# Patient Record
Sex: FEMALE | Race: WHITE | NOT HISPANIC OR LATINO | Employment: OTHER | URBAN - METROPOLITAN AREA
[De-identification: names, ages, dates, MRNs, and addresses within clinical notes are randomized per-mention and may not be internally consistent; named-entity substitution may affect disease eponyms.]

---

## 2017-04-21 RX ORDER — THYROID 60 MG
TABLET ORAL
Qty: 14 TABLET | Refills: 0 | Status: SHIPPED | OUTPATIENT
Start: 2017-04-21 | End: 2017-06-11 | Stop reason: SDUPTHER

## 2017-06-09 RX ORDER — THYROID,PORK 90 MG
TABLET ORAL
Qty: 30 TABLET | Refills: 5 | Status: SHIPPED | OUTPATIENT
Start: 2017-06-09 | End: 2017-07-13 | Stop reason: SDUPTHER

## 2017-06-12 RX ORDER — THYROID 60 MG
TABLET ORAL
Qty: 14 TABLET | Refills: 0 | Status: SHIPPED | OUTPATIENT
Start: 2017-06-12 | End: 2017-08-24 | Stop reason: SDUPTHER

## 2017-06-13 RX ORDER — THYROID 60 MG
TABLET ORAL
Qty: 14 TABLET | Refills: 0 | Status: SHIPPED | OUTPATIENT
Start: 2017-06-13 | End: 2017-07-13 | Stop reason: SDUPTHER

## 2017-07-13 ENCOUNTER — TELEPHONE (OUTPATIENT)
Dept: FAMILY MEDICINE CLINIC | Facility: CLINIC | Age: 57
End: 2017-07-13

## 2017-07-13 RX ORDER — LEVOTHYROXINE AND LIOTHYRONINE 38; 9 UG/1; UG/1
60 TABLET ORAL DAILY
Qty: 30 TABLET | Refills: 0 | Status: SHIPPED | OUTPATIENT
Start: 2017-07-13 | End: 2019-07-10

## 2017-07-13 RX ORDER — THYROID,PORK 90 MG
90 TABLET ORAL DAILY
Qty: 30 TABLET | Refills: 0 | Status: SHIPPED | OUTPATIENT
Start: 2017-07-13 | End: 2017-08-24 | Stop reason: SDUPTHER

## 2017-07-13 NOTE — TELEPHONE ENCOUNTER
Made pt appt for 7/19/17    ---- Message from YOHANNES Ricardo sent at 7/12/2017  2:39 PM EDT -----  pls call pt and let her know due FU apt this mo for thyroid labs, does she need refill meds until apt?    ----- Message -----     From: Jean Carlos Myers     Sent: 7/12/2017   2:10 PM       To: YOHANNES Ricardo

## 2017-07-19 ENCOUNTER — OFFICE VISIT (OUTPATIENT)
Dept: FAMILY MEDICINE CLINIC | Facility: CLINIC | Age: 57
End: 2017-07-19

## 2017-07-19 VITALS
OXYGEN SATURATION: 93 % | HEART RATE: 67 BPM | HEIGHT: 64 IN | SYSTOLIC BLOOD PRESSURE: 124 MMHG | WEIGHT: 169 LBS | BODY MASS INDEX: 28.85 KG/M2 | DIASTOLIC BLOOD PRESSURE: 60 MMHG

## 2017-07-19 DIAGNOSIS — Z13.9 SCREENING: ICD-10-CM

## 2017-07-19 DIAGNOSIS — Z11.59 NEED FOR HEPATITIS C SCREENING TEST: ICD-10-CM

## 2017-07-19 DIAGNOSIS — J45.30 REACTIVE AIRWAY DISEASE, MILD PERSISTENT, UNCOMPLICATED: ICD-10-CM

## 2017-07-19 DIAGNOSIS — E03.8 OTHER SPECIFIED HYPOTHYROIDISM: Primary | ICD-10-CM

## 2017-07-19 LAB
ALBUMIN SERPL-MCNC: 4.5 G/DL (ref 3.5–5.2)
ALBUMIN/GLOB SERPL: 1.3 G/DL
ALP SERPL-CCNC: 82 U/L (ref 39–117)
ALT SERPL W P-5'-P-CCNC: 16 U/L (ref 1–33)
ANION GAP SERPL CALCULATED.3IONS-SCNC: 11.7 MMOL/L
AST SERPL-CCNC: 19 U/L (ref 1–32)
BILIRUB SERPL-MCNC: 0.5 MG/DL (ref 0.1–1.2)
BUN BLD-MCNC: 13 MG/DL (ref 6–20)
BUN/CREAT SERPL: 15.7 (ref 7–25)
CALCIUM SPEC-SCNC: 10.2 MG/DL (ref 8.6–10.5)
CHLORIDE SERPL-SCNC: 103 MMOL/L (ref 98–107)
CHOLEST SERPL-MCNC: 202 MG/DL (ref 0–200)
CO2 SERPL-SCNC: 26.3 MMOL/L (ref 22–29)
CREAT BLD-MCNC: 0.83 MG/DL (ref 0.57–1)
ERYTHROCYTE [DISTWIDTH] IN BLOOD BY AUTOMATED COUNT: 13 % (ref 4.5–15)
GFR SERPL CREATININE-BSD FRML MDRD: 70 ML/MIN/1.73
GLOBULIN UR ELPH-MCNC: 3.5 GM/DL
GLUCOSE BLD-MCNC: 87 MG/DL (ref 65–99)
HCT VFR BLD AUTO: 44.2 % (ref 31–42)
HCV AB SER DONR QL: NORMAL
HDLC SERPL-MCNC: 30 MG/DL (ref 40–60)
HGB BLD-MCNC: 15.1 G/DL (ref 12–18)
LDLC SERPL CALC-MCNC: 112 MG/DL (ref 0–100)
LDLC/HDLC SERPL: 3.74 {RATIO}
LYMPHOCYTES # BLD AUTO: 2.4 10*3/MM3 (ref 1.2–3.4)
LYMPHOCYTES NFR BLD AUTO: 33.9 % (ref 21–51)
MCH RBC QN AUTO: 29.6 PG (ref 26.1–33.1)
MCHC RBC AUTO-ENTMCNC: 34 G/DL (ref 33–37)
MCV RBC AUTO: 86.8 FL (ref 80–99)
MONOCYTES # BLD AUTO: 0.4 10*3/MM3 (ref 0.1–0.6)
MONOCYTES NFR BLD AUTO: 5.8 % (ref 2–9)
NEUTROPHILS # BLD AUTO: 4.3 10*3/MM3 (ref 1.4–6.5)
NEUTROPHILS NFR BLD AUTO: 60.3 % (ref 42–75)
PLATELET # BLD AUTO: 246 10*3/MM3 (ref 150–450)
PMV BLD AUTO: 7.4 FL (ref 7.1–10.5)
POTASSIUM BLD-SCNC: 4.7 MMOL/L (ref 3.5–5.2)
PROT SERPL-MCNC: 8 G/DL (ref 6–8.5)
RBC # BLD AUTO: 5.09 10*6/MM3 (ref 4–6)
SODIUM BLD-SCNC: 141 MMOL/L (ref 136–145)
T-UPTAKE NFR SERPL: 1 TBI (ref 0.8–1.3)
T4 SERPL-MCNC: 6.01 MCG/DL (ref 4.5–11.7)
TRIGL SERPL-MCNC: 299 MG/DL (ref 0–150)
TSH SERPL DL<=0.05 MIU/L-ACNC: 1.06 MIU/ML (ref 0.27–4.2)
VLDLC SERPL-MCNC: 59.8 MG/DL (ref 5–40)
WBC NRBC COR # BLD: 7.2 10*3/MM3 (ref 4.5–10)

## 2017-07-19 PROCEDURE — 85025 COMPLETE CBC W/AUTO DIFF WBC: CPT | Performed by: NURSE PRACTITIONER

## 2017-07-19 PROCEDURE — 36415 COLL VENOUS BLD VENIPUNCTURE: CPT | Performed by: NURSE PRACTITIONER

## 2017-07-19 PROCEDURE — 84443 ASSAY THYROID STIM HORMONE: CPT | Performed by: NURSE PRACTITIONER

## 2017-07-19 PROCEDURE — 84479 ASSAY OF THYROID (T3 OR T4): CPT | Performed by: NURSE PRACTITIONER

## 2017-07-19 PROCEDURE — 86803 HEPATITIS C AB TEST: CPT | Performed by: NURSE PRACTITIONER

## 2017-07-19 PROCEDURE — 99213 OFFICE O/P EST LOW 20 MIN: CPT | Performed by: NURSE PRACTITIONER

## 2017-07-19 PROCEDURE — 84436 ASSAY OF TOTAL THYROXINE: CPT | Performed by: NURSE PRACTITIONER

## 2017-07-19 PROCEDURE — 80053 COMPREHEN METABOLIC PANEL: CPT | Performed by: NURSE PRACTITIONER

## 2017-07-19 PROCEDURE — 80061 LIPID PANEL: CPT | Performed by: NURSE PRACTITIONER

## 2017-07-19 NOTE — PATIENT INSTRUCTIONS
check labs and call with results, cont armour, gave sample breo and coupon and erx breo 200 mcg, enc overdue FU with GYN Dr Brown

## 2017-07-19 NOTE — PROGRESS NOTES
Yordan Evans is a 57 y.o. female.     History of Present Illness   Here to FU on hypothyroid on armour 60 mg T, R, Sat, Sun, 90 mg M, W, F, last TSH 07/16 normal, no sx of high or low thyroid  Saw Dr Torrez pulm 07/16 dx post-inflammatory reactive airway and started on breo 100 mcg had to increase 200 mcg no SOA wheezing cough former smoker with last CXR 04/16 and CT chest 07/16  Hx of hernia repair with mesh Dr Merino and notes sometimes gets abd pain or feels mesh when lifting or moving but no pain currently  Sees Dr Kishor Schroeder overdue pap and mammo  Last colonoscopy Dr Abreu age 48 due next year    The following portions of the patient's history were reviewed and updated as appropriate: allergies, current medications, past family history, past medical history, past social history, past surgical history and problem list.    Review of Systems   Constitutional: Negative for fever.   HENT: Negative for trouble swallowing and voice change.    Respiratory: Negative for cough, chest tightness, shortness of breath and wheezing.    Cardiovascular: Negative for chest pain, palpitations and leg swelling.   Gastrointestinal: Negative for abdominal distention, abdominal pain, anal bleeding and blood in stool.   Endocrine: Negative for cold intolerance, heat intolerance, polydipsia, polyphagia and polyuria.   Neurological: Negative for dizziness and headaches.   All other systems reviewed and are negative.      Objective   Physical Exam   Constitutional: She is oriented to person, place, and time. She appears well-developed and well-nourished.   HENT:   Head: Normocephalic and atraumatic.   Eyes: Conjunctivae and EOM are normal. Pupils are equal, round, and reactive to light.   Neck: Normal range of motion. Neck supple. No thyromegaly present.   Cardiovascular: Normal rate, regular rhythm and normal heart sounds.    Pulmonary/Chest: Effort normal and breath sounds normal.   Musculoskeletal: Normal range of  motion.   Lymphadenopathy:     She has no cervical adenopathy.   Neurological: She is alert and oriented to person, place, and time.   Skin: Skin is warm and dry.   Psychiatric: She has a normal mood and affect. Her behavior is normal. Judgment and thought content normal.   Vitals reviewed.      Assessment/Plan   Adamaris was seen today for follow-up.    Diagnoses and all orders for this visit:    Other specified hypothyroidism  -     Thyroid Panel With TSH    Reactive airway disease, mild persistent, uncomplicated  -     CBC & Differential  -     Comprehensive Metabolic Panel  -     CBC Auto Differential    Need for hepatitis C screening test  -     Hepatitis C Antibody    Screening  -     CBC & Differential  -     Comprehensive Metabolic Panel  -     Lipid Panel  -     CBC Auto Differential    Other orders  -     Fluticasone Furoate-Vilanterol (BREO ELLIPTA) 200-25 MCG/INH aerosol powder ; Inhale 200 mcg Daily.    check labs and call with results, cont armour, gave sample breo and coupon and erx breo 200 mcg, enc overdue FU with GYN Dr Schroeder

## 2017-07-21 ENCOUNTER — TELEPHONE (OUTPATIENT)
Dept: FAMILY MEDICINE CLINIC | Facility: CLINIC | Age: 57
End: 2017-07-21

## 2017-07-21 NOTE — TELEPHONE ENCOUNTER
Hep C screening negative, normal. Thyroid panel all normal. BS, kidney, liver normal. No anemias. Chol elevated slightly and triglycerides high 299 goal < 150, recommend fish oil 1000 mg daily and low chol diet and exercise

## 2017-08-23 ENCOUNTER — TELEPHONE (OUTPATIENT)
Dept: FAMILY MEDICINE CLINIC | Facility: CLINIC | Age: 57
End: 2017-08-23

## 2017-08-24 ENCOUNTER — TELEPHONE (OUTPATIENT)
Dept: FAMILY MEDICINE CLINIC | Facility: CLINIC | Age: 57
End: 2017-08-24

## 2017-08-24 RX ORDER — LEVOTHYROXINE AND LIOTHYRONINE 38; 9 UG/1; UG/1
60 TABLET ORAL DAILY
Qty: 90 TABLET | Refills: 1 | Status: SHIPPED | OUTPATIENT
Start: 2017-08-24 | End: 2018-07-19 | Stop reason: SDUPTHER

## 2017-08-24 RX ORDER — THYROID,PORK 90 MG
90 TABLET ORAL DAILY
Qty: 90 TABLET | Refills: 1 | Status: SHIPPED | OUTPATIENT
Start: 2017-08-24 | End: 2019-07-10

## 2018-07-19 ENCOUNTER — OFFICE VISIT (OUTPATIENT)
Dept: OBSTETRICS AND GYNECOLOGY | Age: 58
End: 2018-07-19

## 2018-07-19 ENCOUNTER — APPOINTMENT (OUTPATIENT)
Dept: WOMENS IMAGING | Facility: HOSPITAL | Age: 58
End: 2018-07-19

## 2018-07-19 VITALS
BODY MASS INDEX: 28.34 KG/M2 | HEIGHT: 64 IN | DIASTOLIC BLOOD PRESSURE: 84 MMHG | WEIGHT: 166 LBS | SYSTOLIC BLOOD PRESSURE: 132 MMHG

## 2018-07-19 DIAGNOSIS — E03.9 ACQUIRED HYPOTHYROIDISM: ICD-10-CM

## 2018-07-19 DIAGNOSIS — Z12.11 SCREEN FOR COLON CANCER: ICD-10-CM

## 2018-07-19 DIAGNOSIS — Z13.89 SCREENING FOR BLOOD OR PROTEIN IN URINE: ICD-10-CM

## 2018-07-19 DIAGNOSIS — K21.9 GASTROESOPHAGEAL REFLUX DISEASE WITHOUT ESOPHAGITIS: ICD-10-CM

## 2018-07-19 DIAGNOSIS — Z12.31 SCREENING MAMMOGRAM, ENCOUNTER FOR: ICD-10-CM

## 2018-07-19 DIAGNOSIS — Z01.419 WELL FEMALE EXAM WITH ROUTINE GYNECOLOGICAL EXAM: Primary | ICD-10-CM

## 2018-07-19 DIAGNOSIS — Z12.4 PAP SMEAR FOR CERVICAL CANCER SCREENING: ICD-10-CM

## 2018-07-19 LAB
BILIRUB BLD-MCNC: NEGATIVE MG/DL
CLARITY, POC: CLEAR
COLOR UR: YELLOW
GLUCOSE UR STRIP-MCNC: NEGATIVE MG/DL
KETONES UR QL: NEGATIVE
LEUKOCYTE EST, POC: NEGATIVE
NITRITE UR-MCNC: NEGATIVE MG/ML
PH UR: 6 [PH] (ref 5–8)
PROT UR STRIP-MCNC: NEGATIVE MG/DL
RBC # UR STRIP: NEGATIVE /UL
SP GR UR: 1.01 (ref 1–1.03)
UROBILINOGEN UR QL: NORMAL

## 2018-07-19 PROCEDURE — 77067 SCR MAMMO BI INCL CAD: CPT | Performed by: RADIOLOGY

## 2018-07-19 PROCEDURE — 81002 URINALYSIS NONAUTO W/O SCOPE: CPT | Performed by: OBSTETRICS & GYNECOLOGY

## 2018-07-19 PROCEDURE — 99396 PREV VISIT EST AGE 40-64: CPT | Performed by: OBSTETRICS & GYNECOLOGY

## 2018-07-19 NOTE — PROGRESS NOTES
Routine Annual Visit    2018    Patient: Adamaris Evans          MR#:[id number]      History of Present Illness    Chief Complaint   Patient presents with   • Establish Care     Old/new patient   • Gynecologic Exam     Annual.  Last pap 4/20/15, negative. Last mammo 5/4/15, negative. Colon        58 y.o. female  who presents for annual exam.    The patient presents for routine annual exam feeling well without complaints.  She reports her last screening colonoscopy was more than 10 years ago    No LMP recorded (lmp unknown). Patient is postmenopausal.  Obstetric History:  OB History      Para Term  AB Living    2 2 2     2    SAB TAB Ectopic Molar Multiple Live Births              2         Menstrual History:     No LMP recorded (lmp unknown). Patient is postmenopausal.       Sexual History:       ________________________________________  Patient Active Problem List   Diagnosis   • Acquired hypothyroidism   • Gastroesophageal reflux disease   • Well female exam with routine gynecological exam       Past Medical History:   Diagnosis Date   • GERD (gastroesophageal reflux disease)    • History of fibromyalgia    • Hyperthyroidism        Past Surgical History:   Procedure Laterality Date   • BUNIONECTOMY Left    • DENTAL PROCEDURE      implants   • DIAGNOSTIC LAPAROSCOPY     • ECTOPIC PREGNANCY     • TONSILLECTOMY     • TUBAL ABDOMINAL LIGATION     • UMBILICAL HERNIA REPAIR      w/Mesh.  Dr. Nolasco.       History   Smoking Status   • Former Smoker   Smokeless Tobacco   • Never Used       Family History   Problem Relation Age of Onset   • Thyroid disease Mother    • Neuropathy Mother    • Clotting disorder Father    • Alzheimer's disease Father    • Dementia Father    • Clotting disorder Sister    • Clotting disorder Daughter        Prior to Admission medications    Medication Sig Start Date End Date Taking? Authorizing Provider   RUBEN THYROID 90 MG tablet Take 1  "tablet by mouth Daily. 8/24/17  Yes YOHANNES Ricardo   BIOTIN PO Take  by mouth. 3/6/14  Yes Historical Provider, MD   Fluticasone Furoate-Vilanterol (BREO ELLIPTA) 200-25 MCG/INH aerosol powder  Inhale 200 mcg Daily.  Patient taking differently: Inhale 200 mcg As Needed. 7/19/17  Yes YOHANNES Ricardo   Probiotic Product (TRUBIOTICS) capsule Take  by mouth. 7/10/15  Yes Historical Provider, MD   Thyroid (RUBEN THYROID) 60 MG PO tablet Take 1 tablet by mouth Daily. 7/13/17  Yes YOHANNES Ricardo   Fluticasone Furoate-Vilanterol 200-25 MCG/INH aerosol powder  Inhale. 7/19/17 7/19/18 Yes Historical Provider, MD   albuterol (PROAIR RESPICLICK) 108 (90 BASE) MCG/ACT inhaler Inhale 2 puffs every 4 (four) hours as needed for wheezing. 5/11/16   YOHANNES Ricardo   KRILL OIL PO Take 1 capsule by mouth. 10/12/15   Historical Provider, MD   Omega-3 Fatty Acids (OMEGA 3 PO) Take  by mouth. 12/29/14   Historical Provider, MD   Thyroid (RUBEN THYROID) 60 MG PO tablet Take 1 tablet by mouth Daily. 8/24/17 7/19/18  YOHANNES Ricardo     ________________________________________    Current contraception: post menopausal status  History of abnormal Pap smear: no  Family history of uterine or ovarian cancer: no  Family History of colon cancer/colon polyps: no  History of abnormal mammogram: no  History of abnormal lipids: yes - No treatement per IM as this time     The following portions of the patient's history were reviewed and updated as appropriate: allergies, current medications, past family history, past medical history, past social history, past surgical history and problem list.    Review of Systems    Pertinent items are noted in HPI.     Objective   Physical Exam    /84   Ht 162.6 cm (64\")   Wt 75.3 kg (166 lb)   LMP  (LMP Unknown)   Breastfeeding? No   BMI 28.49 kg/m²    BP Readings from Last 3 Encounters:   07/19/18 132/84   07/19/17 124/60   04/29/16 110/70 " "     Wt Readings from Last 3 Encounters:   07/19/18 75.3 kg (166 lb)   07/19/17 76.7 kg (169 lb)   04/29/16 77.9 kg (171 lb 12.8 oz)        BMI: Estimated body mass index is 28.49 kg/m² as calculated from the following:    Height as of this encounter: 162.6 cm (64\").    Weight as of this encounter: 75.3 kg (166 lb).    General:   alert, appears stated age, cooperative and no distress   Heart: regular rate and rhythm, S1, S2 normal, no murmur, click, rub or gallop   Lungs: clear to auscultation bilaterally   Abdomen: soft, non-tender, without masses or organomegaly   Breast: inspection negative, no nipple discharge or bleeding, no masses or nodularity palpable   Vulva: normal, moderate atrophy   Vagina: normal mucosa, atrophy    Cervix: no lesions   Uterus: normal size   Adnexa: exam limited by habitus     As part of wellness and prevention, the following topics were discussed with the patient:     []  Nutrition  [x]  Physical activity/regular exercise   []  Healthy weight  []  Injury prevention  []  Smoking cessation  []  Substance misuse/abuse  []  Sexual behavior  []  STD prevention  []  Contaception  []  Dental health  []  Mental health  []  Immunization  [x]  Encouraged SBE       Assessment:    Adamaris was seen today for establish care and gynecologic exam.    Diagnoses and all orders for this visit:    Well female exam with routine gynecological exam    Screening for blood or protein in urine  -     POC Urinalysis Dipstick    Pap smear for cervical cancer screening  -     IgP, Aptima HPV    Screening mammogram, encounter for  -     Mammo Screening Bilateral With CAD; Future    Acquired hypothyroidism    Gastroesophageal reflux disease without esophagitis    Screen for colon cancer  -     Ambulatory Referral For Screening Colonoscopy          Plan:  Return in about 1 year (around 7/19/2019) for Annual GYN exam.      Kishor Schroeder MD  7/19/2018 10:09 AM  "

## 2018-07-24 ENCOUNTER — TELEPHONE (OUTPATIENT)
Dept: OBSTETRICS AND GYNECOLOGY | Age: 58
End: 2018-07-24

## 2018-07-24 LAB
CYTOLOGIST CVX/VAG CYTO: NORMAL
CYTOLOGY CVX/VAG DOC THIN PREP: NORMAL
DX ICD CODE: NORMAL
HIV 1 & 2 AB SER-IMP: NORMAL
HPV I/H RISK 4 DNA CVX QL PROBE+SIG AMP: NEGATIVE
OTHER STN SPEC: NORMAL
PATH REPORT.FINAL DX SPEC: NORMAL
STAT OF ADQ CVX/VAG CYTO-IMP: NORMAL

## 2018-07-24 NOTE — TELEPHONE ENCOUNTER
----- Message from Kishor Schroeder MD sent at 7/24/2018  1:32 PM EDT -----  Call pt:  PAP is negative.

## 2019-07-09 ENCOUNTER — TELEPHONE (OUTPATIENT)
Dept: OBSTETRICS AND GYNECOLOGY | Age: 59
End: 2019-07-09

## 2019-07-10 ENCOUNTER — PROCEDURE VISIT (OUTPATIENT)
Dept: OBSTETRICS AND GYNECOLOGY | Age: 59
End: 2019-07-10

## 2019-07-10 ENCOUNTER — OFFICE VISIT (OUTPATIENT)
Dept: OBSTETRICS AND GYNECOLOGY | Age: 59
End: 2019-07-10

## 2019-07-10 VITALS
SYSTOLIC BLOOD PRESSURE: 130 MMHG | HEIGHT: 64 IN | WEIGHT: 168 LBS | DIASTOLIC BLOOD PRESSURE: 84 MMHG | BODY MASS INDEX: 28.68 KG/M2

## 2019-07-10 DIAGNOSIS — R10.2 PELVIC PAIN: Primary | ICD-10-CM

## 2019-07-10 LAB
BILIRUB BLD-MCNC: NEGATIVE MG/DL
CLARITY, POC: CLEAR
COLOR UR: YELLOW
GLUCOSE UR STRIP-MCNC: NEGATIVE MG/DL
KETONES UR QL: NEGATIVE
LEUKOCYTE EST, POC: NEGATIVE
NITRITE UR-MCNC: NEGATIVE MG/ML
PH UR: 5.5 [PH] (ref 5–8)
PROT UR STRIP-MCNC: NEGATIVE MG/DL
RBC # UR STRIP: NEGATIVE /UL
SP GR UR: 1 (ref 1–1.03)
UROBILINOGEN UR QL: NORMAL

## 2019-07-10 PROCEDURE — 76830 TRANSVAGINAL US NON-OB: CPT | Performed by: OBSTETRICS & GYNECOLOGY

## 2019-07-10 PROCEDURE — 99213 OFFICE O/P EST LOW 20 MIN: CPT | Performed by: NURSE PRACTITIONER

## 2019-07-10 PROCEDURE — 81002 URINALYSIS NONAUTO W/O SCOPE: CPT | Performed by: NURSE PRACTITIONER

## 2019-07-10 RX ORDER — LEVOTHYROXINE SODIUM 0.1 MG/1
100 TABLET ORAL DAILY
COMMUNITY
Start: 2019-07-05

## 2019-07-10 NOTE — PROGRESS NOTES
[facility]  Routine Annual Visit    7/10/2019    Patient: Adamaris Evans          MR#:[id number]      History of Present Illness    59 y.o. female  who presents with complaint of intermittent pelvic discomfort the past 2 weeks now seeming to resolve. She describes the discomfort as cramping and heaviness. Denies dysuria, backache, fever/chills, vaginal bleeding. No associated symptoms. Hx uterine ablation.       No LMP recorded (lmp unknown). Patient is postmenopausal.  Obstetric History:  OB History      Para Term  AB Living    2 2 2     2    SAB TAB Ectopic Molar Multiple Live Births              2         Menstrual History:     No LMP recorded (lmp unknown). Patient is postmenopausal.       Sexual History:       ________________________________________  Patient Active Problem List   Diagnosis   • Acquired hypothyroidism   • Gastroesophageal reflux disease   • Well female exam with routine gynecological exam       Past Medical History:   Diagnosis Date   • GERD (gastroesophageal reflux disease)    • History of fibromyalgia    • Hyperthyroidism        Past Surgical History:   Procedure Laterality Date   • BUNIONECTOMY Left    • DENTAL PROCEDURE  2008    implants   • DIAGNOSTIC LAPAROSCOPY     • ECTOPIC PREGNANCY     • TONSILLECTOMY     • TUBAL ABDOMINAL LIGATION     • UMBILICAL HERNIA REPAIR      w/Mesh.  Dr. Nolasco.       Social History     Tobacco Use   Smoking Status Former Smoker   Smokeless Tobacco Never Used       Family History   Problem Relation Age of Onset   • Thyroid disease Mother    • Neuropathy Mother    • Clotting disorder Father    • Alzheimer's disease Father    • Dementia Father    • Clotting disorder Sister    • Clotting disorder Daughter        Prior to Admission medications    Medication Sig Start Date End Date Taking? Authorizing Provider   Multiple Vitamin (MULTI-VITAMIN DAILY PO) Take  by mouth.   Yes Provider, Historical, MD   BIOTIN  "PO Take  by mouth. 3/6/14   Ramone Pagan MD   levothyroxine (SYNTHROID, LEVOTHROID) 100 MCG tablet  7/5/19   Ramone Pagan MD   Probiotic Product (TRUBIOTICS) capsule Take  by mouth. 7/10/15   Ramone Pagan MD   albuterol (PROAIR RESPICLICK) 108 (90 BASE) MCG/ACT inhaler Inhale 2 puffs every 4 (four) hours as needed for wheezing. 5/11/16 7/10/19  Stephany Lala APRN ARMOUR THYROID 90 MG tablet Take 1 tablet by mouth Daily. 8/24/17 7/10/19  Stephany Lala APRN   Fluticasone Furoate-Vilanterol (BREO ELLIPTA) 200-25 MCG/INH aerosol powder  Inhale 200 mcg Daily.  Patient taking differently: Inhale 200 mcg As Needed. 7/19/17 7/10/19  Stephany Lala APRN   KRILL OIL PO Take 1 capsule by mouth. 10/12/15 7/10/19  Ramone Pagan MD   Omega-3 Fatty Acids (OMEGA 3 PO) Take  by mouth. 12/29/14 7/10/19  Ramone Pagan MD   Thyroid (ARMOUR THYROID) 60 MG PO tablet Take 1 tablet by mouth Daily. 7/13/17 7/10/19  Stephany Lala APRN     ________________________________________  The following portions of the patient's history were reviewed and updated as appropriate: allergies, current medications, past family history, past medical history, past social history, past surgical history and problem list.    Review of Systems   Constitutional: Negative for chills, fatigue and fever.   Gastrointestinal: Negative for abdominal distention and abdominal pain.   Genitourinary: Positive for pelvic pain. Negative for decreased urine volume, difficulty urinating, dyspareunia, dysuria, frequency, genital sores, hematuria, menstrual problem, urgency, vaginal bleeding, vaginal discharge and vaginal pain.     Objective   Physical Exam    /84   Ht 162.6 cm (64\")   Wt 76.2 kg (168 lb)   LMP  (LMP Unknown)   BMI 28.84 kg/m²    BP Readings from Last 3 Encounters:   07/10/19 130/84   07/19/18 132/84   07/19/17 124/60      Wt Readings from Last 3 Encounters:   07/10/19 " "76.2 kg (168 lb)   07/19/18 75.3 kg (166 lb)   07/19/17 76.7 kg (169 lb)        BMI: Estimated body mass index is 28.84 kg/m² as calculated from the following:    Height as of this encounter: 162.6 cm (64\").    Weight as of this encounter: 76.2 kg (168 lb).        General:   alert, appears stated age, cooperative and no distress                   Vulva: normal   Vagina: normal mucosa, normal discharge, atrophic appearance    Cervix: no cervical motion tenderness and no lesions   Uterus: normal size, mobile, non-tender or normal shape and consistency   Adnexa: exam limited by habitus     Assessment:    1. Pelvic pain- exam and US unremarkable. Discomfort seems to be resolving. Discussed with Dr. Schroeder- agrees US unremarkable. Plan to monitor symptoms and return in 1 month for annual exam. Also discuss with PCP at upcoming appointment. Call for new or worsening symptoms. Adamaris agrees with plan.    Plan:    []  Rx:   []  Mammogram request made  []  PAP done  []  Occult fecal blood test ([facility])  []  Labs:   []  GC/Chl/TV  []  DEXA scan   []  Referral for colonoscopy:           Akiko Winston, APRN  7/10/2019 1:51 PM  "

## 2019-07-18 ENCOUNTER — OFFICE VISIT (OUTPATIENT)
Dept: OBSTETRICS AND GYNECOLOGY | Age: 59
End: 2019-07-18

## 2019-07-18 VITALS
HEIGHT: 64 IN | SYSTOLIC BLOOD PRESSURE: 120 MMHG | BODY MASS INDEX: 28.85 KG/M2 | DIASTOLIC BLOOD PRESSURE: 70 MMHG | WEIGHT: 169 LBS

## 2019-07-18 DIAGNOSIS — R10.2 PELVIC PAIN: Primary | ICD-10-CM

## 2019-07-18 PROCEDURE — 99213 OFFICE O/P EST LOW 20 MIN: CPT | Performed by: NURSE PRACTITIONER

## 2019-07-18 NOTE — PROGRESS NOTES
"[facility]  Routine Annual Visit    2019    Patient: Adamaris Evans          MR#:[id number]      History of Present Illness    59 y.o. female  who presents with complaint of possible prolapse. Adamaris reports the past several days noticing pelvic pressure and a bulge. \"Feels like something is falling out.\" she denies urinary symptoms. She reports persistent pelvic pain- for the most part unchanged from last visit. US unremarkable at that time. Adamaris has since also discussed her symptoms with PCP. PCP considering CT scan if symptoms persist. Adamaris has an upcoming annual exam with Dr. Schroeder. Denies fever/chills, feels well otherwise. She does report some constipation.      No LMP recorded (lmp unknown). Patient is postmenopausal.  Obstetric History:  OB History      Para Term  AB Living    2 2 2     2    SAB TAB Ectopic Molar Multiple Live Births              2         Menstrual History:     No LMP recorded (lmp unknown). Patient is postmenopausal.       Sexual History:       ________________________________________  Patient Active Problem List   Diagnosis   • Acquired hypothyroidism   • Gastroesophageal reflux disease   • Well female exam with routine gynecological exam       Past Medical History:   Diagnosis Date   • GERD (gastroesophageal reflux disease)    • History of fibromyalgia    • Hyperthyroidism        Past Surgical History:   Procedure Laterality Date   • BUNIONECTOMY Left    • DENTAL PROCEDURE      implants   • DIAGNOSTIC LAPAROSCOPY     • ECTOPIC PREGNANCY     • TONSILLECTOMY     • TUBAL ABDOMINAL LIGATION     • UMBILICAL HERNIA REPAIR      w/Mesh.  Dr. Nolasco.       Social History     Tobacco Use   Smoking Status Former Smoker   Smokeless Tobacco Never Used       Family History   Problem Relation Age of Onset   • Thyroid disease Mother    • Neuropathy Mother    • Clotting disorder Father    • Alzheimer's disease Father    • " "Dementia Father    • Clotting disorder Sister    • Clotting disorder Daughter        Prior to Admission medications    Medication Sig Start Date End Date Taking? Authorizing Provider   levothyroxine (SYNTHROID, LEVOTHROID) 100 MCG tablet  7/5/19  Yes Ramone Pagan MD   Multiple Vitamin (MULTI-VITAMIN DAILY PO) Take  by mouth.   Yes Ramone Pagan MD   Probiotic Product (TRUBIOTICS) capsule Take  by mouth. 7/10/15  Yes Ramone Pagan MD   BIOTIN PO Take  by mouth. 3/6/14   Ramone Pagan MD     ________________________________________    The following portions of the patient's history were reviewed and updated as appropriate: allergies, current medications, past family history, past medical history, past social history, past surgical history and problem list.    Review of Systems   Constitutional: Negative for chills, fatigue and fever.   Gastrointestinal: Negative for abdominal distention and abdominal pain.   Genitourinary: Positive for pelvic pain. Negative for difficulty urinating, dyspareunia, dysuria, flank pain, frequency, genital sores, hematuria, vaginal bleeding, vaginal discharge and vaginal pain.       Objective   Physical Exam    /70   Ht 162.6 cm (64\")   Wt 76.7 kg (169 lb)   LMP  (LMP Unknown)   Breastfeeding? No   BMI 29.01 kg/m²    BP Readings from Last 3 Encounters:   07/18/19 120/70   07/10/19 130/84   07/19/18 132/84      Wt Readings from Last 3 Encounters:   07/18/19 76.7 kg (169 lb)   07/10/19 76.2 kg (168 lb)   07/19/18 75.3 kg (166 lb)        BMI: Estimated body mass index is 29.01 kg/m² as calculated from the following:    Height as of this encounter: 162.6 cm (64\").    Weight as of this encounter: 76.7 kg (169 lb).        General:   alert, appears stated age, cooperative and no distress                   Vulva: normal   Vagina: normal mucosa, normal discharge, atrophic appearance    Cervix: no cervical motion tenderness and no lesions   Uterus: normal " size, mobile, non-tender or normal shape and consistency   Adnexa: no mass, fullness, tenderness     Assessment:    1. Exam again benign and unremarkable. No prolapse noted.  Etiology of her discomfort is unclear. CMP and CBC ordered today. Recommend keep plan for annual exam with Dr. Schroeder and follow up next month also with PCP and for CT scan. Advised to report to the ER with severe pain.    Plan:    []  Rx:   []  Mammogram request made  []  PAP done  []  Occult fecal blood test ([facility]ure)  []  Labs:   []  GC/Chl/TV  []  DEXA scan   []  Referral for colonoscopy:           Akiko Winston, YOHANNES  7/18/2019 3:34 PM

## 2019-07-19 ENCOUNTER — TELEPHONE (OUTPATIENT)
Dept: OBSTETRICS AND GYNECOLOGY | Age: 59
End: 2019-07-19

## 2019-07-19 LAB
ALBUMIN SERPL-MCNC: 4.7 G/DL (ref 3.6–4.8)
ALBUMIN/GLOB SERPL: 1.9 {RATIO} (ref 1.2–2.2)
ALP SERPL-CCNC: 77 IU/L (ref 39–117)
ALT SERPL-CCNC: 10 IU/L (ref 0–32)
AST SERPL-CCNC: 13 IU/L (ref 0–40)
BASOPHILS # BLD AUTO: 0 X10E3/UL (ref 0–0.2)
BASOPHILS NFR BLD AUTO: 1 %
BILIRUB SERPL-MCNC: 0.3 MG/DL (ref 0–1.2)
BUN SERPL-MCNC: 8 MG/DL (ref 8–27)
BUN/CREAT SERPL: 10 (ref 12–28)
CALCIUM SERPL-MCNC: 9.3 MG/DL (ref 8.7–10.3)
CHLORIDE SERPL-SCNC: 106 MMOL/L (ref 96–106)
CO2 SERPL-SCNC: 21 MMOL/L (ref 20–29)
CREAT SERPL-MCNC: 0.83 MG/DL (ref 0.57–1)
EOSINOPHIL # BLD AUTO: 0.2 X10E3/UL (ref 0–0.4)
EOSINOPHIL NFR BLD AUTO: 3 %
ERYTHROCYTE [DISTWIDTH] IN BLOOD BY AUTOMATED COUNT: 12.9 % (ref 12.3–15.4)
GLOBULIN SER CALC-MCNC: 2.5 G/DL (ref 1.5–4.5)
GLUCOSE SERPL-MCNC: 84 MG/DL (ref 65–99)
HCT VFR BLD AUTO: 43.8 % (ref 34–46.6)
HGB BLD-MCNC: 15.3 G/DL (ref 11.1–15.9)
IMM GRANULOCYTES # BLD AUTO: 0 X10E3/UL (ref 0–0.1)
IMM GRANULOCYTES NFR BLD AUTO: 0 %
LYMPHOCYTES # BLD AUTO: 3 X10E3/UL (ref 0.7–3.1)
LYMPHOCYTES NFR BLD AUTO: 40 %
MCH RBC QN AUTO: 29.8 PG (ref 26.6–33)
MCHC RBC AUTO-ENTMCNC: 34.9 G/DL (ref 31.5–35.7)
MCV RBC AUTO: 85 FL (ref 79–97)
MONOCYTES # BLD AUTO: 0.5 X10E3/UL (ref 0.1–0.9)
MONOCYTES NFR BLD AUTO: 7 %
NEUTROPHILS # BLD AUTO: 3.7 X10E3/UL (ref 1.4–7)
NEUTROPHILS NFR BLD AUTO: 49 %
PLATELET # BLD AUTO: 225 X10E3/UL (ref 150–450)
POTASSIUM SERPL-SCNC: 4.2 MMOL/L (ref 3.5–5.2)
PROT SERPL-MCNC: 7.2 G/DL (ref 6–8.5)
RBC # BLD AUTO: 5.14 X10E6/UL (ref 3.77–5.28)
SODIUM SERPL-SCNC: 143 MMOL/L (ref 134–144)
WBC # BLD AUTO: 7.5 X10E3/UL (ref 3.4–10.8)

## 2019-07-19 NOTE — TELEPHONE ENCOUNTER
----- Message from YOHANNES Mayberry sent at 7/19/2019  9:13 AM EDT -----  Please inform patient her lab work from yesterday returned unremarkable. Keep plan as discussed at her appointment.

## 2019-08-21 ENCOUNTER — OFFICE VISIT (OUTPATIENT)
Dept: OBSTETRICS AND GYNECOLOGY | Age: 59
End: 2019-08-21

## 2019-08-21 VITALS
BODY MASS INDEX: 29.37 KG/M2 | DIASTOLIC BLOOD PRESSURE: 70 MMHG | HEIGHT: 64 IN | SYSTOLIC BLOOD PRESSURE: 116 MMHG | WEIGHT: 172 LBS

## 2019-08-21 DIAGNOSIS — Z01.419 WELL FEMALE EXAM WITH ROUTINE GYNECOLOGICAL EXAM: Primary | ICD-10-CM

## 2019-08-21 DIAGNOSIS — E03.9 ACQUIRED HYPOTHYROIDISM: ICD-10-CM

## 2019-08-21 DIAGNOSIS — R10.2 PELVIC PAIN: ICD-10-CM

## 2019-08-21 DIAGNOSIS — Z12.4 PAP SMEAR FOR CERVICAL CANCER SCREENING: ICD-10-CM

## 2019-08-21 DIAGNOSIS — Z13.89 SCREENING FOR BLOOD OR PROTEIN IN URINE: ICD-10-CM

## 2019-08-21 DIAGNOSIS — Z12.31 SCREENING MAMMOGRAM, ENCOUNTER FOR: ICD-10-CM

## 2019-08-21 DIAGNOSIS — E78.5 DYSLIPIDEMIA: ICD-10-CM

## 2019-08-21 PROCEDURE — 81002 URINALYSIS NONAUTO W/O SCOPE: CPT | Performed by: OBSTETRICS & GYNECOLOGY

## 2019-08-21 PROCEDURE — 99396 PREV VISIT EST AGE 40-64: CPT | Performed by: OBSTETRICS & GYNECOLOGY

## 2019-08-21 NOTE — PROGRESS NOTES
Routine Annual Visit    2019    Patient: Adamaris Evans          MR#:[id number]      History of Present Illness    Chief Complaint   Patient presents with   • Gynecologic Exam     Annual.  Last pap 18, negative. Last mammo 18, negative. Colonoscopy 3/9/19-10 yr. f/u. SEEN [facility] IN 2019 FOR PELVIC PAIN, SOME BETTER.  STILL OCCURS IN THE EVENING.        59 y.o. female  who presents for annual exam.    The patient presents for routine annual exam feeling well without major complaints.  She was seen last month for intermittent moderate lower pelvic pain with a negative work-up.  The pain is since resolved.        No LMP recorded (lmp unknown). Patient is postmenopausal.  Obstetric History:  OB History      Para Term  AB Living    2 2 2     2    SAB TAB Ectopic Molar Multiple Live Births              2         Menstrual History:     No LMP recorded (lmp unknown). Patient is postmenopausal.       Sexual History:       ________________________________________  Patient Active Problem List   Diagnosis   • Acquired hypothyroidism   • Gastroesophageal reflux disease   • Well female exam with routine gynecological exam   • Dyslipidemia       Past Medical History:   Diagnosis Date   • GERD (gastroesophageal reflux disease)    • History of fibromyalgia    • Hyperthyroidism        Past Surgical History:   Procedure Laterality Date   • BUNIONECTOMY Left    • COLONOSCOPY  2019    [facility].  Dr. Kenya Kothari.   • DENTAL PROCEDURE      implants   • DIAGNOSTIC LAPAROSCOPY     • ECTOPIC PREGNANCY     • TONSILLECTOMY     • TUBAL ABDOMINAL LIGATION     • UMBILICAL HERNIA REPAIR      w/Mesh.  Dr. Nolasco.       Social History     Tobacco Use   Smoking Status Former Smoker   Smokeless Tobacco Never Used       Family History   Problem Relation Age of Onset   • Thyroid disease Mother    • Neuropathy Mother    • Clotting disorder Father    • Alzheimer's disease  "Father    • Dementia Father    • Clotting disorder Sister    • Clotting disorder Daughter        Prior to Admission medications    Medication Sig Start Date End Date Taking? Authorizing Provider   levothyroxine (SYNTHROID, LEVOTHROID) 100 MCG tablet Take 100 mcg by mouth Daily. 7/5/19  Yes Ramone Pagan MD   Multiple Vitamin (MULTI-VITAMIN DAILY PO) Take  by mouth.   Yes Ramone Pagan MD   Probiotic Product (TRUBIOTICS) capsule Take  by mouth Daily. 7/10/15  Yes Ramone Pagan MD   BIOTIN PO Take  by mouth. 3/6/14   Ramone Pagan MD     ________________________________________    Current contraception: status post hysterectomy  History of abnormal Pap smear: no  Family history of uterine or ovarian cancer: no  Family History of colon cancer/colon polyps: no  History of abnormal mammogram: no  History of abnormal lipids: yes - Not treated     The following portions of the patient's history were reviewed and updated as appropriate: allergies, current medications, past family history, past medical history, past social history, past surgical history and problem list.    Review of Systems    Pertinent items are noted in HPI.     Objective   Physical Exam    /70   Ht 162.6 cm (64\")   Wt 78 kg (172 lb)   LMP  (LMP Unknown)   Breastfeeding? No   BMI 29.52 kg/m²    BP Readings from Last 3 Encounters:   08/21/19 116/70   07/18/19 120/70   07/10/19 130/84      Wt Readings from Last 3 Encounters:   08/21/19 78 kg (172 lb)   07/18/19 76.7 kg (169 lb)   07/10/19 76.2 kg (168 lb)        BMI: Estimated body mass index is 29.52 kg/m² as calculated from the following:    Height as of this encounter: 162.6 cm (64\").    Weight as of this encounter: 78 kg (172 lb).       General: alert, appears stated age and cooperative   Heart: regular rate and rhythm, S1, S2 normal, no murmur, click, rub or gallop   Lungs: clear to auscultation bilaterally   Abdomen: soft, non-tender, without masses, no " organomegaly   Breast: inspection negative, no nipple discharge or bleeding, no masses or nodularity palpable   Vulva: normal and bartholin's, Urethra, [place]'s normal   Vagina: normal mucosa, normal discharge, atrophic appearance    Cervix: no lesions   Uterus: normal size   Adnexa: normal adnexa     As part of wellness and prevention, the following topics were discussed with the patient:     []  Nutrition  []  Physical activity/regular exercise   []  Healthy weight  []  Injury prevention  []  Smoking cessation  []  Substance misuse/abuse  []  Sexual behavior  []  STD prevention  []  Contaception  []  Dental health  []  Mental health  []  Immunization  [x]  Encouraged SBE        Assessment:    Adamaris was seen today for gynecologic exam.    Diagnoses and all orders for this visit:    Well female exam with routine gynecological exam  -     IgP, Aptima HPV    Screening for blood or protein in urine  -     POC Urinalysis Dipstick    Pap smear for cervical cancer screening  -     IgP, Aptima HPV    Acquired hypothyroidism    Screening mammogram, encounter for  -     Mammo Screening Bilateral With CAD; Future    Dyslipidemia          Plan:  Return in about 1 year (around 8/21/2020) for Annual GYN exam.      Kishor Schroeder MD  8/21/2019 11:02 AM

## 2019-08-23 LAB
CYTOLOGIST CVX/VAG CYTO: NORMAL
CYTOLOGY CVX/VAG DOC CYTO: NORMAL
CYTOLOGY CVX/VAG DOC THIN PREP: NORMAL
DX ICD CODE: NORMAL
HIV 1 & 2 AB SER-IMP: NORMAL
HPV I/H RISK 4 DNA CVX QL PROBE+SIG AMP: NEGATIVE
OTHER STN SPEC: NORMAL
STAT OF ADQ CVX/VAG CYTO-IMP: NORMAL

## 2019-08-26 ENCOUNTER — TELEPHONE (OUTPATIENT)
Dept: OBSTETRICS AND GYNECOLOGY | Age: 59
End: 2019-08-26

## 2019-08-26 NOTE — TELEPHONE ENCOUNTER
----- Message from Kishor Schroeder MD sent at 8/23/2019  4:03 PM EDT -----  Call pt:  PAP is negative.

## 2019-09-10 ENCOUNTER — APPOINTMENT (OUTPATIENT)
Dept: MAMMOGRAPHY | Facility: HOSPITAL | Age: 59
End: 2019-09-10

## 2023-07-26 DIAGNOSIS — Z12.31 SCREENING MAMMOGRAM FOR BREAST CANCER: Primary | ICD-10-CM
